# Patient Record
Sex: FEMALE | Race: WHITE
[De-identification: names, ages, dates, MRNs, and addresses within clinical notes are randomized per-mention and may not be internally consistent; named-entity substitution may affect disease eponyms.]

---

## 2017-05-12 ENCOUNTER — HOSPITAL ENCOUNTER (OUTPATIENT)
Dept: HOSPITAL 17 - CLAB | Age: 37
End: 2017-05-12
Attending: FAMILY MEDICINE
Payer: COMMERCIAL

## 2017-05-12 DIAGNOSIS — R53.81: Primary | ICD-10-CM

## 2017-05-12 DIAGNOSIS — R53.83: ICD-10-CM

## 2017-05-12 LAB
ALP SERPL-CCNC: 59 U/L (ref 45–117)
ALT SERPL-CCNC: 24 U/L (ref 10–53)
ANION GAP SERPL CALC-SCNC: 7 MEQ/L (ref 5–15)
AST SERPL-CCNC: 14 U/L (ref 15–37)
BASOPHILS # BLD AUTO: 0 TH/MM3 (ref 0–0.2)
BASOPHILS NFR BLD: 0.6 % (ref 0–2)
BILIRUB SERPL-MCNC: 0.3 MG/DL (ref 0.2–1)
BUN SERPL-MCNC: 15 MG/DL (ref 7–18)
CHLORIDE SERPL-SCNC: 104 MEQ/L (ref 98–107)
EOSINOPHIL # BLD: 0.2 TH/MM3 (ref 0–0.4)
EOSINOPHIL NFR BLD: 2.4 % (ref 0–4)
ERYTHROCYTE [DISTWIDTH] IN BLOOD BY AUTOMATED COUNT: 12.7 % (ref 11.6–17.2)
GFR SERPLBLD BASED ON 1.73 SQ M-ARVRAT: 80 ML/MIN (ref 89–?)
HCO3 BLD-SCNC: 28.5 MEQ/L (ref 21–32)
HCT VFR BLD CALC: 42 % (ref 35–46)
HDLC SERPL-MCNC: 50.7 MG/DL (ref 40–60)
HEMO FLAGS: (no result)
LDLC SERPL-MCNC: 150 MG/DL (ref 0–99)
LYMPHOCYTES # BLD AUTO: 2.7 TH/MM3 (ref 1–4.8)
LYMPHOCYTES NFR BLD AUTO: 33.3 % (ref 9–44)
MCH RBC QN AUTO: 29.8 PG (ref 27–34)
MCHC RBC AUTO-ENTMCNC: 34 % (ref 32–36)
MCV RBC AUTO: 87.7 FL (ref 80–100)
MONOCYTES NFR BLD: 6.6 % (ref 0–8)
NEUTROPHILS # BLD AUTO: 4.6 TH/MM3 (ref 1.8–7.7)
NEUTROPHILS NFR BLD AUTO: 57.1 % (ref 16–70)
PLATELET # BLD: 414 TH/MM3 (ref 150–450)
POTASSIUM SERPL-SCNC: 4.2 MEQ/L (ref 3.5–5.1)
RBC # BLD AUTO: 4.79 MIL/MM3 (ref 4–5.3)
SODIUM SERPL-SCNC: 139 MEQ/L (ref 136–145)
WBC # BLD AUTO: 8.1 TH/MM3 (ref 4–11)

## 2017-05-12 PROCEDURE — 84480 ASSAY TRIIODOTHYRONINE (T3): CPT

## 2017-05-12 PROCEDURE — 36415 COLL VENOUS BLD VENIPUNCTURE: CPT

## 2017-05-12 PROCEDURE — 80053 COMPREHEN METABOLIC PANEL: CPT

## 2017-05-12 PROCEDURE — 85025 COMPLETE CBC W/AUTO DIFF WBC: CPT

## 2017-05-12 PROCEDURE — 84443 ASSAY THYROID STIM HORMONE: CPT

## 2017-05-12 PROCEDURE — 80061 LIPID PANEL: CPT

## 2017-05-12 PROCEDURE — 84439 ASSAY OF FREE THYROXINE: CPT

## 2017-08-21 ENCOUNTER — HOSPITAL ENCOUNTER (EMERGENCY)
Dept: HOSPITAL 17 - NEPE | Age: 37
Discharge: HOME | End: 2017-08-21
Payer: COMMERCIAL

## 2017-08-21 VITALS
OXYGEN SATURATION: 100 % | HEART RATE: 100 BPM | SYSTOLIC BLOOD PRESSURE: 147 MMHG | DIASTOLIC BLOOD PRESSURE: 95 MMHG | TEMPERATURE: 97.6 F | RESPIRATION RATE: 16 BRPM

## 2017-08-21 DIAGNOSIS — R51: Primary | ICD-10-CM

## 2017-08-21 LAB
ANION GAP SERPL CALC-SCNC: 7 MEQ/L (ref 5–15)
APTT BLD: 27.4 SEC (ref 24.3–30.1)
BASOPHILS # BLD AUTO: 0 TH/MM3 (ref 0–0.2)
BASOPHILS NFR BLD: 0.3 % (ref 0–2)
BUN SERPL-MCNC: 8 MG/DL (ref 7–18)
CHLORIDE SERPL-SCNC: 104 MEQ/L (ref 98–107)
EOSINOPHIL # BLD: 0.1 TH/MM3 (ref 0–0.4)
EOSINOPHIL NFR BLD: 1.2 % (ref 0–4)
ERYTHROCYTE [DISTWIDTH] IN BLOOD BY AUTOMATED COUNT: 12.4 % (ref 11.6–17.2)
GFR SERPLBLD BASED ON 1.73 SQ M-ARVRAT: 86 ML/MIN (ref 89–?)
HCO3 BLD-SCNC: 27.4 MEQ/L (ref 21–32)
HCT VFR BLD CALC: 40.2 % (ref 35–46)
HEMO FLAGS: (no result)
INR PPP: 0.9 RATIO
LYMPHOCYTES # BLD AUTO: 2.7 TH/MM3 (ref 1–4.8)
LYMPHOCYTES NFR BLD AUTO: 25.7 % (ref 9–44)
MCH RBC QN AUTO: 30.3 PG (ref 27–34)
MCHC RBC AUTO-ENTMCNC: 34.2 % (ref 32–36)
MCV RBC AUTO: 88.7 FL (ref 80–100)
MONOCYTES NFR BLD: 5.5 % (ref 0–8)
NEUTROPHILS # BLD AUTO: 7.2 TH/MM3 (ref 1.8–7.7)
NEUTROPHILS NFR BLD AUTO: 67.3 % (ref 16–70)
PLATELET # BLD: 400 TH/MM3 (ref 150–450)
POTASSIUM SERPL-SCNC: 3.7 MEQ/L (ref 3.5–5.1)
PROTHROMBIN TIME: 9.4 SEC (ref 9.8–11.6)
RBC # BLD AUTO: 4.53 MIL/MM3 (ref 4–5.3)
SODIUM SERPL-SCNC: 138 MEQ/L (ref 136–145)
WBC # BLD AUTO: 10.7 TH/MM3 (ref 4–11)

## 2017-08-21 PROCEDURE — 84703 CHORIONIC GONADOTROPIN ASSAY: CPT

## 2017-08-21 PROCEDURE — 80048 BASIC METABOLIC PNL TOTAL CA: CPT

## 2017-08-21 PROCEDURE — 85730 THROMBOPLASTIN TIME PARTIAL: CPT

## 2017-08-21 PROCEDURE — 96375 TX/PRO/DX INJ NEW DRUG ADDON: CPT

## 2017-08-21 PROCEDURE — 85025 COMPLETE CBC W/AUTO DIFF WBC: CPT

## 2017-08-21 PROCEDURE — 99285 EMERGENCY DEPT VISIT HI MDM: CPT

## 2017-08-21 PROCEDURE — 96374 THER/PROPH/DIAG INJ IV PUSH: CPT

## 2017-08-21 PROCEDURE — 70450 CT HEAD/BRAIN W/O DYE: CPT

## 2017-08-21 PROCEDURE — 85610 PROTHROMBIN TIME: CPT

## 2017-08-21 NOTE — RADRPT
EXAM DATE/TIME:  08/21/2017 05:05 

 

[facility] COMPARISON:     

No previous studies available for comparison.

 

 

INDICATIONS :     

Cephalgia x3 days.

                      

 

RADIATION DOSE:     

56.35 CTDIvol (mGy) 

 

 

 

MEDICAL HISTORY :     

None  

 

SURGICAL HISTORY :      

None. 

 

ENCOUNTER:      

Initial

 

ACUITY:      

3 days

 

PAIN SCALE:      

5/10

 

LOCATION:        

cranial 

 

TECHNIQUE:     

Multiple contiguous axial images were obtained of the head.  Using automated exposure control and adj
ustment of the mA and/or kV according to patient size, radiation dose was kept as low as reasonably a
chievable to obtain optimal diagnostic quality images.   DICOM format image data is available electro
nically for review and comparison.  

 

FINDINGS:     

 

CEREBRUM:     

The ventricles are normal for age.  No evidence of midline shift, mass lesion, hemorrhage or acute in
farction.  No extra-axial fluid collections are seen.

 

POSTERIOR FOSSA:     

The cerebellum and brainstem are intact.  The 4th ventricle is midline.  The cerebellopontine angle i
s unremarkable.

 

EXTRACRANIAL:     

The visualized portion of the orbits is intact.

 

SKULL:     

The calvaria is intact.  No evidence of skull fracture.

 

CONCLUSION:     

1. No acute intracranial abnormalities.

 

 

 

 Roderick Prater MD on August 21, 2017 at 5:23           

Board Certified Radiologist.

 This report was verified electronically.

## 2017-08-21 NOTE — PD
HPI


Chief Complaint:  Headache


Time Seen by Provider:  04:12


Travel History


International Travel<30 days:  No


Contact w/Intl Traveler<30days:  No


Traveled to known affect area:  No





History of Present Illness


HPI


Patient is a 36-year-old female with history of migraines, presents the 

emergency room complaints of 24 hours of migraine headache.  Patient reports 

that for the past 24 hours, she has had a migraine which she cannot get rid of.

  Reports that her migraine today is typical of her normal migraines, reports 

that she is prescribed Maxalt for her migraine headaches, she has taken about 6 

tabs of these medications without relief of symptoms.  Reports that she follows 

with Dr. Aparicio as well as Dr. Agudelo for her migraine headaches.  Patient reports 

that she has photophobia associated with her migraines, reports that anything 

can bring on her migraine headache.  Patient with no fevers or chills, reports 

nausea with no vomiting.  Patient reports that she last had imaging her brain 

about 10 years ago.





PFSH


Past Medical History


ADD:  Yes


Diminished Hearing:  No


Headaches:  Yes


Migraines:  Yes


Pregnant?:  Not Pregnant





Social History


Alcohol Use:  No


Tobacco Use:  No


Substance Use:  No





Allergies-Medications


(Allergen,Severity, Reaction):  


Coded Allergies:  


     No Known Allergies (Unverified , 4/27/16)


Reported Meds & Prescriptions





Reported Meds & Active Scripts


Active


Reported


Hydrocodone-Acetaminophen 5-325 mg Tab 1 Tab PO DAILY PRN


Maxalt (Rizatriptan Benzoate) 10 Mg Tab 10 Mg PO AS DIRECTED








Review of Systems


General / Constitutional:  No: Fever, Chills


Eyes:  No: Visual changes


HENT:  Positive: Headaches, No: Neck Stiffness, Neck Pain, Masses


Cardiovascular:  No: Chest Pain or Discomfort


Respiratory:  No: Shortness of Breath


Gastrointestinal:  No: Abdominal Pain


Genitourinary:  No: Dysuria


Musculoskeletal:  No: Pain


Skin:  No Rash


Neurologic:  Positive: Headache, No: Weakness, Dizziness, Focal Abnormalities


Psychiatric:  No: Depression


Endocrine:  No: Polydipsia


Hematologic/Lymphatic:  No: Easy Bruising





Physical Exam


Narrative


GENERAL: Moderate distress


SKIN: Focused skin assessment warm/dry.


HEAD: Atraumatic. Normocephalic. 


EYES: Pupils equal and round. No scleral icterus. No injection or drainage. 


ENT: No nasal bleeding or discharge.  Mucous membranes pink and moist.


NECK: Trachea midline. No JVD.  No nuchal rigidity, negative Kernig and 

Babinski sign


CARDIOVASCULAR: Regular rate and rhythm.  No murmur appreciated.


RESPIRATORY: No accessory muscle use. Clear to auscultation. Breath sounds 

equal bilaterally. 


GASTROINTESTINAL: Abdomen soft, non-tender, nondistended. Hepatic and splenic 

margins not palpable. 


MUSCULOSKELETAL: No obvious deformities. No clubbing.  No cyanosis.  No edema. 


NEUROLOGICAL: Awake and alert. No obvious cranial nerve deficits.  Motor 

grossly within normal limits. Normal speech.  Cranial nerves 2- 12 grossly 

intact with no neurological deficits


PSYCHIATRIC: Appropriate mood and affect; insight and judgment normal.





Data


Data


Last Documented VS





Vital Signs








  Date Time  Temp Pulse Resp B/P (MAP) Pulse Ox O2 Delivery O2 Flow Rate FiO2


 


8/21/17 03:43 97.6 100 16 147/95 (112) 100 Room Air  








Orders





 Orders


Complete Blood Count With Diff (8/21/17 04:22)


Basic Metabolic Panel (Bmp) (8/21/17 04:22)


Ct Brain W/O Iv Contrast(Rout) (8/21/17 04:22)


Ecg Monitoring (8/21/17 04:22)


Iv Access Insert/Monitor (8/21/17 04:22)


Oximetry (8/21/17 04:22)


Sodium Chloride 0.9% Flush (Ns Flush) (8/21/17 04:30)


Diphenhydramine Inj (Benadryl Inj) (8/21/17 04:30)


Metoclopramide Inj (Reglan Inj) (8/21/17 04:30)


Sodium Chlor 0.9% 1000 Ml Inj (Ns 1000 M (8/21/17 04:22)


Ed Urine Pregnancytest Poc (8/21/17 04:22)


Dexamethasone Inj (Decadron Inj) (8/21/17 04:30)


Ketorolac Inj (Toradol Inj) (8/21/17 04:30)


Prothrombin Time / Inr (Pt) (8/21/17 04:24)


Act Partial Throm Time (Ptt) (8/21/17 04:24)


Ketorolac Inj (Toradol Inj) (8/21/17 06:30)





Labs





Laboratory Tests








Test


  8/21/17


04:35


 


White Blood Count 10.7 TH/MM3 


 


Red Blood Count 4.53 MIL/MM3 


 


Hemoglobin 13.7 GM/DL 


 


Hematocrit 40.2 % 


 


Mean Corpuscular Volume 88.7 FL 


 


Mean Corpuscular Hemoglobin 30.3 PG 


 


Mean Corpuscular Hemoglobin


Concent 34.2 % 


 


 


Red Cell Distribution Width 12.4 % 


 


Platelet Count 400 TH/MM3 


 


Mean Platelet Volume 7.6 FL 


 


Neutrophils (%) (Auto) 67.3 % 


 


Lymphocytes (%) (Auto) 25.7 % 


 


Monocytes (%) (Auto) 5.5 % 


 


Eosinophils (%) (Auto) 1.2 % 


 


Basophils (%) (Auto) 0.3 % 


 


Neutrophils # (Auto) 7.2 TH/MM3 


 


Lymphocytes # (Auto) 2.7 TH/MM3 


 


Monocytes # (Auto) 0.6 TH/MM3 


 


Eosinophils # (Auto) 0.1 TH/MM3 


 


Basophils # (Auto) 0.0 TH/MM3 


 


CBC Comment DIFF FINAL 


 


Differential Comment  


 


Prothrombin Time 9.4 SEC 


 


Prothromb Time International


Ratio 0.9 RATIO 


 


 


Activated Partial


Thromboplast Time 27.4 SEC 


 


 


Blood Urea Nitrogen 8 MG/DL 


 


Creatinine 0.77 MG/DL 


 


Random Glucose 102 MG/DL 


 


Calcium Level 8.5 MG/DL 


 


Sodium Level 138 MEQ/L 


 


Potassium Level 3.7 MEQ/L 


 


Chloride Level 104 MEQ/L 


 


Carbon Dioxide Level 27.4 MEQ/L 


 


Anion Gap 7 MEQ/L 


 


Estimat Glomerular Filtration


Rate 86 ML/MIN 


 











[facility]


Medical Decision Making


Medical Screen Exam Complete:  Yes


Emergency Medical Condition:  Yes


Interpretation(s)





Vital Signs








  Date Time  Temp Pulse Resp B/P (MAP) Pulse Ox O2 Delivery O2 Flow Rate FiO2


 


8/21/17 03:43 97.6 100 16 147/95 (112) 100 Room Air  








Differential Diagnosis


Differential includes migraine headache, intracranial hemorrhage unlikely, 

subarachnoid hemorrhage though unlikely, electrolyte abnormality


Narrative Course


Patient is a 36-year-old female who presents to emergency room complaints of 

migraine headache.  Patient has history of years of migraine headache, reports 

that Maxalt usually helps her symptoms, reports that this time, she is unable 

to break her migraine headache symptoms.  Patient with no fevers or chills, 

patient with no meningismus signs.  Patient reports photophobia with her 

symptoms.  Patient with normal neurological exam.  Reports that she last had 

imaging of brain about 10 years ago.  Plan to obtain CT of the head.  I will 

obtain basic labs, will administer migraine cocktail.  Will monitor on a 

cardiac monitor.








Vital Signs








  Date Time  Temp Pulse Resp B/P (MAP) Pulse Ox O2 Delivery O2 Flow Rate FiO2


 


8/21/17 03:43 97.6 100 16 147/95 (112) 100 Room Air  








Laboratory Tests








Test


  8/21/17


04:35


 


White Blood Count


  10.7 TH/MM3


(4.0-11.0)


 


Red Blood Count


  4.53 MIL/MM3


(4.00-5.30)


 


Hemoglobin


  13.7 GM/DL


(11.6-15.3)


 


Hematocrit


  40.2 %


(35.0-46.0)


 


Mean Corpuscular Volume


  88.7 FL


(80.0-100.0)


 


Mean Corpuscular Hemoglobin


  30.3 PG


(27.0-34.0)


 


Mean Corpuscular Hemoglobin


Concent 34.2 %


(32.0-36.0)


 


Red Cell Distribution Width


  12.4 %


(11.6-17.2)


 


Platelet Count


  400 TH/MM3


(150-450)


 


Mean Platelet Volume


  7.6 FL


(7.0-11.0)


 


Neutrophils (%) (Auto)


  67.3 %


(16.0-70.0)


 


Lymphocytes (%) (Auto)


  25.7 %


(9.0-44.0)


 


Monocytes (%) (Auto)


  5.5 %


(0.0-8.0)


 


Eosinophils (%) (Auto)


  1.2 %


(0.0-4.0)


 


Basophils (%) (Auto)


  0.3 %


(0.0-2.0)


 


Neutrophils # (Auto)


  7.2 TH/MM3


(1.8-7.7)


 


Lymphocytes # (Auto)


  2.7 TH/MM3


(1.0-4.8)


 


Monocytes # (Auto)


  0.6 TH/MM3


(0-0.9)


 


Eosinophils # (Auto)


  0.1 TH/MM3


(0-0.4)


 


Basophils # (Auto)


  0.0 TH/MM3


(0-0.2)


 


CBC Comment DIFF FINAL 


 


Differential Comment  


 


Prothrombin Time


  9.4 SEC


(9.8-11.6)


 


Prothromb Time International


Ratio 0.9 RATIO 


 


 


Activated Partial


Thromboplast Time 27.4 SEC


(24.3-30.1)


 


Blood Urea Nitrogen 8 MG/DL (7-18) 


 


Creatinine


  0.77 MG/DL


(0.50-1.00)


 


Random Glucose


  102 MG/DL


()


 


Calcium Level


  8.5 MG/DL


(8.5-10.1)


 


Sodium Level


  138 MEQ/L


(136-145)


 


Potassium Level


  3.7 MEQ/L


(3.5-5.1)


 


Chloride Level


  104 MEQ/L


()


 


Carbon Dioxide Level


  27.4 MEQ/L


(21.0-32.0)


 


Anion Gap 7 MEQ/L (5-15) 


 


Estimat Glomerular Filtration


Rate 86 ML/MIN


(>89)





CT of head: no acute intracranial abnormalities





Patient reevaluated, patient feeling much better at this time.  Patient reports 

near resolution of her headache.  I reviewed all labs and all studies with 

patient detail.  Patient will follow-up with her primary care doctor and will 

return to emergency room as needed.





Diagnosis





 Primary Impression:  


 Cephalgia


Patient Instructions:  General Instructions





***Additional Instructions:  


**please provide patient with a copy of her lab work and studies at discharge**





Please follow up with your primary care doctor





Return to ER as needed


Disposition:  01 DISCHARGE HOME


Condition:  Stable











Marlene Graf DO Aug 21, 2017 04:30

## 2017-09-02 ENCOUNTER — HOSPITAL ENCOUNTER (EMERGENCY)
Dept: HOSPITAL 17 - NEPD | Age: 37
LOS: 1 days | Discharge: HOME | End: 2017-09-03
Payer: COMMERCIAL

## 2017-09-02 VITALS — HEIGHT: 66 IN | BODY MASS INDEX: 28.34 KG/M2 | WEIGHT: 176.37 LBS

## 2017-09-02 DIAGNOSIS — Z77.21: Primary | ICD-10-CM

## 2017-09-02 PROCEDURE — 99281 EMR DPT VST MAYX REQ PHY/QHP: CPT

## 2017-09-03 VITALS
DIASTOLIC BLOOD PRESSURE: 99 MMHG | SYSTOLIC BLOOD PRESSURE: 153 MMHG | OXYGEN SATURATION: 100 % | TEMPERATURE: 97.5 F | RESPIRATION RATE: 16 BRPM | HEART RATE: 110 BPM
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
## 2017-11-01 ENCOUNTER — HOSPITAL ENCOUNTER (OUTPATIENT)
Dept: HOSPITAL 17 - CLAB | Age: 37
End: 2017-11-01
Attending: FAMILY MEDICINE
Payer: COMMERCIAL

## 2017-11-01 DIAGNOSIS — E78.2: Primary | ICD-10-CM

## 2017-11-01 LAB
ALBUMIN SERPL-MCNC: 3.4 GM/DL (ref 3.4–5)
ALP SERPL-CCNC: 52 U/L (ref 45–117)
ALT SERPL-CCNC: 46 U/L (ref 10–53)
AST SERPL-CCNC: 20 U/L (ref 15–37)
BILIRUB SERPL-MCNC: 0.4 MG/DL (ref 0.2–1)
BUN SERPL-MCNC: 13 MG/DL (ref 7–18)
CALCIUM SERPL-MCNC: 8.7 MG/DL (ref 8.5–10.1)
CHLORIDE SERPL-SCNC: 101 MEQ/L (ref 98–107)
CHOLEST SERPL-MCNC: 196 MG/DL (ref 120–200)
CHOLESTEROL/ HDL RATIO: 3.17 RATIO
CREAT SERPL-MCNC: 0.84 MG/DL (ref 0.5–1)
GFR SERPLBLD BASED ON 1.73 SQ M-ARVRAT: 77 ML/MIN (ref 89–?)
HCO3 BLD-SCNC: 28.2 MEQ/L (ref 21–32)
HDLC SERPL-MCNC: 61.7 MG/DL (ref 40–60)
LDLC SERPL-MCNC: 90 MG/DL (ref 0–99)
PROT SERPL-MCNC: 7.4 GM/DL (ref 6.4–8.2)
SODIUM SERPL-SCNC: 138 MEQ/L (ref 136–145)
TRIGL SERPL-MCNC: 221 MG/DL (ref 42–150)

## 2017-11-01 PROCEDURE — 36415 COLL VENOUS BLD VENIPUNCTURE: CPT

## 2017-11-01 PROCEDURE — 80053 COMPREHEN METABOLIC PANEL: CPT

## 2017-11-01 PROCEDURE — 80061 LIPID PANEL: CPT

## 2018-05-22 ENCOUNTER — HOSPITAL ENCOUNTER (OUTPATIENT)
Dept: HOSPITAL 17 - CLAB | Age: 38
End: 2018-05-22
Attending: FAMILY MEDICINE
Payer: COMMERCIAL

## 2018-05-22 DIAGNOSIS — Z00.00: Primary | ICD-10-CM

## 2018-05-22 LAB
ALBUMIN SERPL-MCNC: 3.4 GM/DL (ref 3.4–5)
ALP SERPL-CCNC: 59 U/L (ref 45–117)
ALT SERPL-CCNC: 42 U/L (ref 10–53)
AST SERPL-CCNC: 26 U/L (ref 15–37)
BASOPHILS # BLD AUTO: 0 TH/MM3 (ref 0–0.2)
BASOPHILS NFR BLD: 0.4 % (ref 0–2)
BILIRUB SERPL-MCNC: 0.4 MG/DL (ref 0.2–1)
BUN SERPL-MCNC: 14 MG/DL (ref 7–18)
CALCIUM SERPL-MCNC: 8.9 MG/DL (ref 8.5–10.1)
CHLORIDE SERPL-SCNC: 103 MEQ/L (ref 98–107)
CHOLEST SERPL-MCNC: 222 MG/DL (ref 120–200)
CHOLESTEROL/ HDL RATIO: 4.35 RATIO
CREAT SERPL-MCNC: 0.83 MG/DL (ref 0.5–1)
EOSINOPHIL # BLD: 0.2 TH/MM3 (ref 0–0.4)
EOSINOPHIL NFR BLD: 2 % (ref 0–4)
ERYTHROCYTE [DISTWIDTH] IN BLOOD BY AUTOMATED COUNT: 13 % (ref 11.6–17.2)
GFR SERPLBLD BASED ON 1.73 SQ M-ARVRAT: 78 ML/MIN (ref 89–?)
HCO3 BLD-SCNC: 25.3 MEQ/L (ref 21–32)
HCT VFR BLD CALC: 40.9 % (ref 35–46)
HDLC SERPL-MCNC: 51 MG/DL (ref 40–60)
HGB BLD-MCNC: 13.8 GM/DL (ref 11.6–15.3)
LDLC SERPL-MCNC: 135 MG/DL (ref 0–99)
LYMPHOCYTES # BLD AUTO: 3.3 TH/MM3 (ref 1–4.8)
LYMPHOCYTES NFR BLD AUTO: 40.6 % (ref 9–44)
MCH RBC QN AUTO: 29.6 PG (ref 27–34)
MCHC RBC AUTO-ENTMCNC: 33.7 % (ref 32–36)
MCV RBC AUTO: 87.7 FL (ref 80–100)
MONOCYTE #: 0.5 TH/MM3 (ref 0–0.9)
MONOCYTES NFR BLD: 6.4 % (ref 0–8)
NEUTROPHILS # BLD AUTO: 4.1 TH/MM3 (ref 1.8–7.7)
NEUTROPHILS NFR BLD AUTO: 50.6 % (ref 16–70)
PLATELET # BLD: 388 TH/MM3 (ref 150–450)
PMV BLD AUTO: 7.4 FL (ref 7–11)
PROT SERPL-MCNC: 7.2 GM/DL (ref 6.4–8.2)
RBC # BLD AUTO: 4.67 MIL/MM3 (ref 4–5.3)
SODIUM SERPL-SCNC: 139 MEQ/L (ref 136–145)
TRIGL SERPL-MCNC: 179 MG/DL (ref 42–150)
WBC # BLD AUTO: 8.1 TH/MM3 (ref 4–11)

## 2018-05-22 PROCEDURE — 80053 COMPREHEN METABOLIC PANEL: CPT

## 2018-05-22 PROCEDURE — 36415 COLL VENOUS BLD VENIPUNCTURE: CPT

## 2018-05-22 PROCEDURE — 84443 ASSAY THYROID STIM HORMONE: CPT

## 2018-05-22 PROCEDURE — 80061 LIPID PANEL: CPT

## 2018-05-22 PROCEDURE — 85025 COMPLETE CBC W/AUTO DIFF WBC: CPT

## 2018-06-20 ENCOUNTER — HOSPITAL ENCOUNTER (OUTPATIENT)
Dept: HOSPITAL 17 - PHSDC | Age: 38
Discharge: HOME | End: 2018-06-20
Attending: OTOLARYNGOLOGY
Payer: COMMERCIAL

## 2018-06-20 VITALS
RESPIRATION RATE: 16 BRPM | HEART RATE: 72 BPM | OXYGEN SATURATION: 100 % | DIASTOLIC BLOOD PRESSURE: 79 MMHG | TEMPERATURE: 97.9 F | SYSTOLIC BLOOD PRESSURE: 140 MMHG

## 2018-06-20 VITALS — BODY MASS INDEX: 30.65 KG/M2 | WEIGHT: 190.7 LBS | HEIGHT: 66 IN

## 2018-06-20 DIAGNOSIS — J34.3: ICD-10-CM

## 2018-06-20 DIAGNOSIS — J35.03: ICD-10-CM

## 2018-06-20 DIAGNOSIS — R09.81: ICD-10-CM

## 2018-06-20 DIAGNOSIS — J34.2: Primary | ICD-10-CM

## 2018-06-20 DIAGNOSIS — R49.0: ICD-10-CM

## 2018-06-20 PROCEDURE — 00160 ANES PX NOSE&SINUS NOS: CPT

## 2018-06-20 PROCEDURE — 30130 EXCISE INFERIOR TURBINATE: CPT

## 2018-06-20 PROCEDURE — 88304 TISSUE EXAM BY PATHOLOGIST: CPT

## 2018-06-20 PROCEDURE — 42821 REMOVE TONSILS AND ADENOIDS: CPT

## 2018-06-20 PROCEDURE — 30520 REPAIR OF NASAL SEPTUM: CPT

## 2018-06-21 ENCOUNTER — HOSPITAL ENCOUNTER (OUTPATIENT)
Dept: HOSPITAL 17 - CLAB | Age: 38
End: 2018-06-21
Attending: OBSTETRICS & GYNECOLOGY
Payer: COMMERCIAL

## 2018-06-21 DIAGNOSIS — N97.9: Primary | ICD-10-CM

## 2018-06-21 DIAGNOSIS — L68.0: ICD-10-CM

## 2018-06-21 LAB
ERYTHROCYTE [DISTWIDTH] IN BLOOD BY AUTOMATED COUNT: 13.2 % (ref 11.6–17.2)
HBA1C MFR BLD: 5.2 % (ref 4.3–6)
HCT VFR BLD CALC: 42.1 % (ref 35–46)
HGB BLD-MCNC: 14 GM/DL (ref 11.6–15.3)
LH SERPL-ACNC: 53 MIU/ML
MCH RBC QN AUTO: 29.5 PG (ref 27–34)
MCHC RBC AUTO-ENTMCNC: 33.2 % (ref 32–36)
MCV RBC AUTO: 89 FL (ref 80–100)
PLATELET # BLD: 443 TH/MM3 (ref 150–450)
PMV BLD AUTO: 7.4 FL (ref 7–11)
RBC # BLD AUTO: 4.74 MIL/MM3 (ref 4–5.3)
WBC # BLD AUTO: 15.6 TH/MM3 (ref 4–11)

## 2018-06-21 PROCEDURE — 85027 COMPLETE CBC AUTOMATED: CPT

## 2018-06-21 PROCEDURE — 84144 ASSAY OF PROGESTERONE: CPT

## 2018-06-21 PROCEDURE — 87389 HIV-1 AG W/HIV-1&-2 AB AG IA: CPT

## 2018-06-21 PROCEDURE — 86787 VARICELLA-ZOSTER ANTIBODY: CPT

## 2018-06-21 PROCEDURE — 86900 BLOOD TYPING SEROLOGIC ABO: CPT

## 2018-06-21 PROCEDURE — 86592 SYPHILIS TEST NON-TREP QUAL: CPT

## 2018-06-21 PROCEDURE — 83036 HEMOGLOBIN GLYCOSYLATED A1C: CPT

## 2018-06-21 PROCEDURE — 82627 DEHYDROEPIANDROSTERONE: CPT

## 2018-06-21 PROCEDURE — 83001 ASSAY OF GONADOTROPIN (FSH): CPT

## 2018-06-21 PROCEDURE — 83498 ASY HYDROXYPROGESTERONE 17-D: CPT

## 2018-06-21 PROCEDURE — G0475 HIV COMBINATION ASSAY: HCPCS

## 2018-06-21 PROCEDURE — 86762 RUBELLA ANTIBODY: CPT

## 2018-06-21 PROCEDURE — 84410 TESTOSTERONE BIOAVAILABLE: CPT

## 2018-06-21 PROCEDURE — 86901 BLOOD TYPING SEROLOGIC RH(D): CPT

## 2018-06-21 PROCEDURE — 36415 COLL VENOUS BLD VENIPUNCTURE: CPT

## 2018-06-21 PROCEDURE — 86705 HEP B CORE ANTIBODY IGM: CPT

## 2018-06-21 PROCEDURE — 84443 ASSAY THYROID STIM HORMONE: CPT

## 2018-06-21 PROCEDURE — 87340 HEPATITIS B SURFACE AG IA: CPT

## 2018-06-21 PROCEDURE — 84403 ASSAY OF TOTAL TESTOSTERONE: CPT

## 2018-06-21 PROCEDURE — 86803 HEPATITIS C AB TEST: CPT

## 2018-06-21 PROCEDURE — 83002 ASSAY OF GONADOTROPIN (LH): CPT

## 2018-06-22 LAB — DHEA-S SERPL-MCNC: 199 MCG/DL (ref 23–266)

## 2018-06-23 LAB
17OHP SERPL-MCNC: 119 NG/DL (ref 16–283)
PROGEST SERPL-MCNC: 0.6 NG/ML

## 2018-06-25 LAB
FREE TESTOSTERONE: 0.58 NG/DL (ref 0.06–1)
TESTOST SERPL-MCNC: 29 NG/DL (ref 8–60)
TESTOSTERONE.FREE+WB SERPL-MCNC: 4.1 NG/DL